# Patient Record
Sex: FEMALE | URBAN - METROPOLITAN AREA
[De-identification: names, ages, dates, MRNs, and addresses within clinical notes are randomized per-mention and may not be internally consistent; named-entity substitution may affect disease eponyms.]

---

## 2018-06-25 ENCOUNTER — APPOINTMENT (RX ONLY)
Dept: URBAN - METROPOLITAN AREA CLINIC 103 | Facility: CLINIC | Age: 63
Setting detail: DERMATOLOGY
End: 2018-06-25

## 2018-06-25 DIAGNOSIS — L72.0 EPIDERMAL CYST: ICD-10-CM

## 2018-06-25 DIAGNOSIS — L82.0 INFLAMED SEBORRHEIC KERATOSIS: ICD-10-CM

## 2018-06-25 DIAGNOSIS — L82.1 OTHER SEBORRHEIC KERATOSIS: ICD-10-CM

## 2018-06-25 DIAGNOSIS — D18.0 HEMANGIOMA: ICD-10-CM

## 2018-06-25 PROBLEM — D18.01 HEMANGIOMA OF SKIN AND SUBCUTANEOUS TISSUE: Status: ACTIVE | Noted: 2018-06-25

## 2018-06-25 PROBLEM — J30.1 ALLERGIC RHINITIS DUE TO POLLEN: Status: ACTIVE | Noted: 2018-06-25

## 2018-06-25 PROBLEM — D23.62 OTHER BENIGN NEOPLASM OF SKIN OF LEFT UPPER LIMB, INCLUDING SHOULDER: Status: ACTIVE | Noted: 2018-06-25

## 2018-06-25 PROCEDURE — ? SHAVE REMOVAL

## 2018-06-25 PROCEDURE — 99203 OFFICE O/P NEW LOW 30 MIN: CPT | Mod: 25

## 2018-06-25 PROCEDURE — ? COUNSELING

## 2018-06-25 PROCEDURE — ? PRESCRIPTION

## 2018-06-25 PROCEDURE — 11300 SHAVE SKIN LESION 0.5 CM/<: CPT

## 2018-06-25 RX ORDER — TRETINOIN 0.25 MG/G
CREAM TOPICAL DAILY
Qty: 45 | Refills: 2 | Status: ERX | COMMUNITY
Start: 2018-06-25

## 2018-06-25 RX ADMIN — TRETINOIN: 0.25 CREAM TOPICAL at 00:00

## 2018-06-25 ASSESSMENT — LOCATION DETAILED DESCRIPTION DERM
LOCATION DETAILED: LOWER STERNUM
LOCATION DETAILED: EPIGASTRIC SKIN
LOCATION DETAILED: LEFT DISTAL CALF
LOCATION DETAILED: LEFT PROXIMAL PRETIBIAL REGION
LOCATION DETAILED: RIGHT PROXIMAL PRETIBIAL REGION
LOCATION DETAILED: LEFT INFERIOR MEDIAL MIDBACK
LOCATION DETAILED: RIGHT PROXIMAL CALF
LOCATION DETAILED: RIGHT PROXIMAL RADIAL DORSAL FOREARM
LOCATION DETAILED: LEFT DISTAL DORSAL FOREARM
LOCATION DETAILED: LEFT MEDIAL UPPER BACK
LOCATION DETAILED: MIDDLE STERNUM
LOCATION DETAILED: PERIUMBILICAL SKIN

## 2018-06-25 ASSESSMENT — LOCATION SIMPLE DESCRIPTION DERM
LOCATION SIMPLE: LEFT FOREARM
LOCATION SIMPLE: LEFT UPPER BACK
LOCATION SIMPLE: RIGHT PRETIBIAL REGION
LOCATION SIMPLE: ABDOMEN
LOCATION SIMPLE: LEFT PRETIBIAL REGION
LOCATION SIMPLE: CHEST
LOCATION SIMPLE: RIGHT CALF
LOCATION SIMPLE: LEFT CALF
LOCATION SIMPLE: RIGHT FOREARM
LOCATION SIMPLE: LEFT LOWER BACK

## 2018-06-25 ASSESSMENT — LOCATION ZONE DERM
LOCATION ZONE: TRUNK
LOCATION ZONE: ARM
LOCATION ZONE: LEG

## 2018-06-25 NOTE — PROCEDURE: MIPS QUALITY
Quality 110: Preventive Care And Screening: Influenza Immunization: Influenza Immunization Administered during Influenza season
Quality 402: Tobacco Use And Help With Quitting Among Adolescents: Patient screened for tobacco and never smoked
Detail Level: Detailed
Quality 46: Medication Reconciliation: For eligible patients only (patients seen within 30 days from discharge) Were discharged medications reconciled with the current medication list in their medication record within 30 days of discharge from the inpatient facility?
Quality 431: Preventive Care And Screening: Unhealthy Alcohol Use - Screening: Patient screened for unhealthy alcohol use using a single question and scores less than 2 times per year
Quality 130: Documentation Of Current Medications In The Medical Record: Current Medications Documented

## 2018-06-25 NOTE — PROCEDURE: SHAVE REMOVAL
Bill For Surgical Tray: no
Notification Instructions: Patient will be notified of biopsy results. However, patient instructed to call the office if not contacted within 2 weeks.
Post-Care Instructions: I reviewed with the patient in detail post-care instructions. Patient is to keep the biopsy site dry overnight, and then apply bacitracin twice daily until healed. Patient may apply hydrogen peroxide soaks to remove any crusting.
Path Notes (To The Dermatopathologist): Please check margins.\\n4mm
Size Of Lesion In Cm (Required): 0.4
Consent was obtained from the patient. The risks and benefits to therapy were discussed in detail. Specifically, the risks of infection, scarring, bleeding, prolonged wound healing, incomplete removal, allergy to anesthesia, nerve injury and recurrence were addressed. Prior to the procedure, the treatment site was clearly identified and confirmed by the patient. All components of Universal Protocol/PAUSE Rule completed.
Billing Type: Third-Party Bill
Hemostasis: Drysol
Was A Bandage Applied: Yes
Medical Necessity Information: It is in your best interest to select a reason for this procedure from the list below. All of these items fulfill various CMS LCD requirements except the new and changing color options.
Anesthesia Type: 1% lidocaine with epinephrine
Biopsy Method: Dermablade
Medical Necessity Clause: This procedure was medically necessary because the lesion that was treated was:
X Size Of Lesion In Cm (Optional): 0
Wound Care: Petrolatum
Detail Level: Detailed

## 2018-09-25 ENCOUNTER — APPOINTMENT (RX ONLY)
Dept: URBAN - METROPOLITAN AREA CLINIC 103 | Facility: CLINIC | Age: 63
Setting detail: DERMATOLOGY
End: 2018-09-25

## 2018-09-25 DIAGNOSIS — L72.0 EPIDERMAL CYST: ICD-10-CM

## 2018-09-25 PROCEDURE — ? COUNSELING

## 2018-09-25 PROCEDURE — 99213 OFFICE O/P EST LOW 20 MIN: CPT

## 2018-09-25 PROCEDURE — ? EXTRACTIONS

## 2018-09-25 ASSESSMENT — LOCATION SIMPLE DESCRIPTION DERM
LOCATION SIMPLE: LEFT CHEEK
LOCATION SIMPLE: RIGHT CHEEK
LOCATION SIMPLE: RIGHT EYELID
LOCATION SIMPLE: RIGHT FOREHEAD

## 2018-09-25 ASSESSMENT — LOCATION DETAILED DESCRIPTION DERM
LOCATION DETAILED: RIGHT INFERIOR NASAL CHEEK
LOCATION DETAILED: LEFT INFERIOR CENTRAL MALAR CHEEK
LOCATION DETAILED: RIGHT MEDIAL FOREHEAD
LOCATION DETAILED: RIGHT MEDIAL CANTHUS

## 2018-09-25 ASSESSMENT — LOCATION ZONE DERM
LOCATION ZONE: FACE
LOCATION ZONE: EYELID

## 2018-09-25 NOTE — PROCEDURE: MIPS QUALITY
Detail Level: Detailed
Quality 402: Tobacco Use And Help With Quitting Among Adolescents: Patient screened for tobacco and never smoked
Quality 46: Medication Reconciliation: For eligible patients only (patients seen within 30 days from discharge) Were discharged medications reconciled with the current medication list in their medication record within 30 days of discharge from the inpatient facility?
Quality 130: Documentation Of Current Medications In The Medical Record: Current Medications Documented
Quality 110: Preventive Care And Screening: Influenza Immunization: Influenza Immunization Administered during Influenza season
Quality 431: Preventive Care And Screening: Unhealthy Alcohol Use - Screening: Patient screened for unhealthy alcohol use using a single question and scores less than 2 times per year

## 2018-09-25 NOTE — PROCEDURE: EXTRACTIONS
Render Number Of Lesions Treated: no
Post-Care Instructions: I reviewed with the patient in detail post-care instructions. Patient is to keep the treatment areas dry overnight, and then apply bacitracin twice daily until healed. Patient may apply hydrogen peroxide soaks to remove any crusting.
Acne Type: Comedonal Lesions
Extraction Method: 11 blade and comedo extractor
Prep Text (Optional): Prior to removal the treatment areas were prepped in the usual fashion.
Consent was obtained and risks were reviewed including but not limited to scarring, infection, bleeding, scabbing, incomplete removal, and allergy to anesthesia.
Detail Level: Detailed